# Patient Record
Sex: FEMALE | Race: ASIAN | Employment: FULL TIME | URBAN - METROPOLITAN AREA
[De-identification: names, ages, dates, MRNs, and addresses within clinical notes are randomized per-mention and may not be internally consistent; named-entity substitution may affect disease eponyms.]

---

## 2018-10-02 ENCOUNTER — RADIANT APPOINTMENT (OUTPATIENT)
Dept: GENERAL RADIOLOGY | Facility: CLINIC | Age: 38
End: 2018-10-02
Attending: FAMILY MEDICINE
Payer: COMMERCIAL

## 2018-10-02 ENCOUNTER — OFFICE VISIT (OUTPATIENT)
Dept: ORTHOPEDICS | Facility: CLINIC | Age: 38
End: 2018-10-02
Payer: COMMERCIAL

## 2018-10-02 VITALS
HEIGHT: 64 IN | BODY MASS INDEX: 21.51 KG/M2 | DIASTOLIC BLOOD PRESSURE: 67 MMHG | SYSTOLIC BLOOD PRESSURE: 114 MMHG | WEIGHT: 126 LBS

## 2018-10-02 DIAGNOSIS — M25.571 PAIN IN JOINT INVOLVING RIGHT ANKLE AND FOOT: Primary | ICD-10-CM

## 2018-10-02 DIAGNOSIS — M76.821 POSTERIOR TIBIAL TENDINITIS OF RIGHT LOWER EXTREMITY: ICD-10-CM

## 2018-10-02 DIAGNOSIS — M25.571 PAIN IN JOINT INVOLVING RIGHT ANKLE AND FOOT: ICD-10-CM

## 2018-10-02 PROCEDURE — 73610 X-RAY EXAM OF ANKLE: CPT | Mod: RT

## 2018-10-02 PROCEDURE — 99203 OFFICE O/P NEW LOW 30 MIN: CPT | Performed by: FAMILY MEDICINE

## 2018-10-02 NOTE — MR AVS SNAPSHOT
"              After Visit Summary   10/2/2018    Elvira Ding    MRN: [id number]           Patient Information     Date Of Birth          1980        Visit Information        Provider Department      10/2/2018 5:40 PM Andrea Peters MD [facility] [place]        Today's Diagnoses     Pain in joint involving right ankle and foot    -  1    Posterior tibial tendinitis of right lower extremity           Follow-ups after your visit        Who to contact     If you have questions or need follow up information about today's clinic visit or your schedule please contact [facility] [place] directly at [phone].  Normal or non-critical lab and imaging results will be communicated to you by MyChart, letter or phone within 4 business days after the clinic has received the results. If you do not hear from us within 7 days, please contact the clinic through MyChart or phone. If you have a critical or abnormal lab result, we will notify you by phone as soon as possible.  Submit refill requests through [employer] or call your pharmacy and they will forward the refill request to us. Please allow 3 business days for your refill to be completed.          Additional Information About Your Visit        MyChart Information     [employer] lets you send messages to your doctor, view your test results, renew your prescriptions, schedule appointments and more. To sign up, go to [email or web address] . Click on \"Log in\" on the left side of the screen, which will take you to the Welcome page. Then click on \"Sign up Now\" on the right side of the page.     You will be asked to enter the access code listed below, as well as some personal information. Please follow the directions to create your username and password.     Your access code is: [email or web address]  Expires: 2018  7:06 PM     Your access code will  in 90 days. If you need help or a new code, please call " "your [place] clinic or [phone].        Care EveryWhere ID     This is your Care EveryWhere ID. This could be used by other organizations to access your [place] medical records  [id number]        Your Vitals Were     Height BMI (Body Mass Index)                5' 4\" (1.626 m) 21.63 kg/m2           Blood Pressure from Last 3 Encounters:   10/02/18 114/67    Weight from Last 3 Encounters:   10/02/18 126 lb (57.2 kg)               Primary Care Provider Office Phone # Fax #    Merry Campos [phone]       [facility] [address] [facility] [address]        Equal Access to Services     [facility]: Hadii aad ku hadasho Soomaali, waaxda luqadaha, qaybta kaalmada adeegyada, waxay idiin hayaan adeeg kharajoce lamary . So [facility] [phone].    ATENCIÓN: Si habla español, tiene a ramierz disposición servicios gratuitos de asistencia lingüística. Llame al [phone].    We comply with applicable federal civil rights laws and [place] laws. We do not discriminate on the basis of race, color, national origin, age, disability, sex, sexual orientation, or gender identity.            Thank you!     Thank you for choosing [facility] [place]  for your care. Our goal is always to provide you with excellent care. Hearing back from our patients is one way we can continue to improve our services. Please take a few minutes to complete the written survey that you may receive in the mail after your visit with us. Thank you!             Your Updated Medication List - Protect others around you: Learn how to safely use, store and throw away your medicines at www.disposemymeds.org.      Notice  As of 10/2/2018  7:06 PM    You have not been prescribed any medications.      "

## 2018-10-02 NOTE — LETTER
"    10/2/2018         RE: Elvira Ding  [address]  [place] [address]        Dear Colleague,    Thank you for referring your patient, Elvira Ding, to the [facility] [place]. Please see a copy of my visit note below.    HPI     [facility]   Clinic Visit s Oct 2, 2018    PCP: System, Provider Not In      [facility] is a 38 year old female who is seen as self referral for   Chief Complaint   Patient presents with     Right Ankle - Pain       Injury: Reports insidious onset without acute precipitating event.   started running 6 months ago, no pain until recently but when increasing mileage.     Location of Pain: right ankle medial, nonradiating   Duration of Pain: 2 week(s)  Rating of Pain at worst: 5/10  Rating of Pain Currently: 4/10  Pain is better with: rest   Pain is worse with: running   Treatment so far consists of: ice and rest  Associated symptoms: swelling Mild  Recent imaging completed: No recent imaging completed.  Prior History of related problems: none    Audience, over 2 years old.   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 No pertinent family history.    Social History     Social History     Marital status:      Spouse name: N/A     Number of children: N/A     Years of education: N/A     Social History Main Topics     Smoking status: Not on file     Smokeless tobacco: Not on file     Alcohol use Not on file       Past Surgical History:   Procedure Laterality Date     NO HISTORY OF SURGERY           Review of Systems   Musculoskeletal: Positive for joint pain.   All other systems reviewed and are negative.        Physical Exam  /67  Ht 5' 4\" (1.626 m)  Wt 126 lb (57.2 kg)  BMI 21.63 kg/m2  Constitutional:well-developed, well-nourished, and in no distress.   Cardiovascular: Intact distal pulses.  "   Neurological: alert. Gait Normal:   Gait, station, stance, and balance appear normal for age  Skin: Skin is warm and dry.   Psychiatric: Mood and affect normal.   Respiratory: unlabored, speaks in full sentences  Lymph: no LAD, no lymphangitis    Right Ankle Exam   Swelling: Mild    Tenderness   The patient is experiencing tenderness in the deltoid.    Range of Motion   Dorsiflexion:     Normal  Plantar flexion: Normal  Inversion:         Normal  Eversion:         Normal    Muscle Strength   Dorsiflexion:         5/5  Plantar flexion:     5/5  Anterior tibial:       5/5  Posterior tibial:     5/5  Gastrosoleus:      5/5  Peroneal muscle: 5/5    Tests   Anterior drawer: Negative  Varus tilt: NegativeComments  Mild ankle valgus vs LEFT             X-ray images Ordered and independently reviewed by me in the office today with the patient. X-ray shows:   Recent Results (from the past 48 hour(s))   XR Ankle Right G/E 3 Views    Narrative    ANKLE THREE VIEWS RIGHT  10/2/2018 6:17 PM     HISTORY:  Pain in joint involving right ankle and foot    COMPARISON: None.    FINDINGS: There is no significant degenerative change. The ankle  mortise appears intact. There is no acute fracture or dislocation.  There are no worrisome bony lesions.      Impression    IMPRESSION: No acute osseous abnormality demonstrated.    CHERRY MELGAR MD     ASSESSMENT/PLAN    ICD-10-CM    1. Pain in joint involving right ankle and foot M25.571 XR Ankle Right G/E 3 Views   2. Posterior tibial tendinitis of right lower extremity M76.821      Mildly valgus ankle with 2-year-old shoes and increased running mileage triggering posterior tibial tendinopathy.  Urged getting quality running shoes from specialty running store and consider super feet arch supports as well.  Physical therapy discussed, deferred for now.  Recheck 1 month if symptoms are not resolving.    Again, thank you for allowing me to participate in the care of your patient.         Sincerely,        Andrea Peters MD

## 2018-10-02 NOTE — PROGRESS NOTES
"[facility]   Clinic Visit s Oct 2, 2018    PCP: System, Provider Not In      Elvira is a 38 year old female who is seen as self referral for   Chief Complaint   Patient presents with     Right Ankle - Pain       Injury: Reports insidious onset without acute precipitating event.   started running 6 months ago, no pain until recently but when increasing mileage.     Location of Pain: right ankle medial, nonradiating   Duration of Pain: 2 week(s)  Rating of Pain at worst: 5/10  Rating of Pain Currently: 4/10  Pain is better with: rest   Pain is worse with: running   Treatment so far consists of: ice and rest  Associated symptoms: swelling Mild  Recent imaging completed: No recent imaging completed.  Prior History of related problems: none    [employer] shoes, over 2 years old.   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 No pertinent family history.    Social History     Social History     Marital status:      Spouse name: N/A     Number of children: N/A     Years of education: N/A     Social History Main Topics     Smoking status: Not on file     Smokeless tobacco: Not on file     Alcohol use Not on file       Past Surgical History:   Procedure Laterality Date     NO HISTORY OF SURGERY           Review of Systems   Musculoskeletal: Positive for joint pain.   All other systems reviewed and are negative.        Physical Exam  /67  Ht 5' 4\" (1.626 m)  Wt 126 lb (57.2 kg)  BMI 21.63 kg/m2  Constitutional:well-developed, well-nourished, and in no distress.   Cardiovascular: Intact distal pulses.    Neurological: alert. Gait Normal:   Gait, station, stance, and balance appear normal for age  Skin: Skin is warm and dry.   Psychiatric: Mood and affect normal.   Respiratory: unlabored, speaks in full sentences  Lymph: no LAD, no lymphangitis    Right Ankle Exam   Swelling: " Mild    Tenderness   The patient is experiencing tenderness in the deltoid.    Range of Motion   Dorsiflexion:     Normal  Plantar flexion: Normal  Inversion:         Normal  Eversion:         Normal    Muscle Strength   Dorsiflexion:         5/5  Plantar flexion:     5/5  Anterior tibial:       5/5  Posterior tibial:     5/5  Gastrosoleus:      5/5  Peroneal muscle: 5/5    Tests   Anterior drawer: Negative  Varus tilt: NegativeComments  Mild ankle valgus vs LEFT             X-ray images Ordered and independently reviewed by me in the office today with the patient. X-ray shows:   Recent Results (from the past 48 hour(s))   XR Ankle Right G/E 3 Views    Narrative    ANKLE THREE VIEWS RIGHT  10/2/2018 6:17 PM     HISTORY:  Pain in joint involving right ankle and foot    COMPARISON: None.    FINDINGS: There is no significant degenerative change. The ankle  mortise appears intact. There is no acute fracture or dislocation.  There are no worrisome bony lesions.      Impression    IMPRESSION: No acute osseous abnormality demonstrated.    CHERRY MELGAR MD     ASSESSMENT/PLAN    ICD-10-CM    1. Pain in joint involving right ankle and foot M25.571 XR Ankle Right G/E 3 Views   2. Posterior tibial tendinitis of right lower extremity M76.821      Mildly valgus ankle with 2-year-old shoes and increased running mileage triggering posterior tibial tendinopathy.  Urged getting quality running shoes from specialty running [employer] and consider super feet arch supports as well.  Physical therapy discussed, deferred for now.  Recheck 1 month if symptoms are not resolving.

## 2021-12-31 ENCOUNTER — OFFICE VISIT (OUTPATIENT)
Dept: FAMILY MEDICINE | Facility: CLINIC | Age: 41
End: 2021-12-31
Payer: COMMERCIAL

## 2021-12-31 VITALS
DIASTOLIC BLOOD PRESSURE: 76 MMHG | OXYGEN SATURATION: 100 % | TEMPERATURE: 98.2 F | SYSTOLIC BLOOD PRESSURE: 114 MMHG | BODY MASS INDEX: 24.68 KG/M2 | HEART RATE: 79 BPM | WEIGHT: 143.8 LBS

## 2021-12-31 DIAGNOSIS — U07.1 INFECTION DUE TO 2019 NOVEL CORONAVIRUS: Primary | ICD-10-CM

## 2021-12-31 DIAGNOSIS — H92.01 DISCOMFORT OF RIGHT EAR: ICD-10-CM

## 2021-12-31 PROBLEM — E55.9 VITAMIN D DEFICIENCY: Status: ACTIVE | Noted: 2018-09-10

## 2021-12-31 PROCEDURE — 99203 OFFICE O/P NEW LOW 30 MIN: CPT | Performed by: FAMILY MEDICINE

## 2021-12-31 NOTE — PROGRESS NOTES
Subjective   Elvira is a 41 year old who presents for the following health issues:    HPI     Ear pain   Hurts more when coughs, 2-3 days, more right sided than left, but left somewhat also. Runny nose.  Positive at home covid test.  Has had a cold for about 8 days. Previously had body aches.  Headache, sore throat. Dayquil/nyquil for 3-4 days.  Has taken tylenol/ibuprofen at times as well.      Review of Systems   Constitutional, HEENT, cardiovascular, pulmonary, GI, , musculoskeletal, neuro, skin, endocrine and psych systems are negative, except as otherwise noted.      Objective    /76 (BP Location: Left arm, Patient Position: Sitting, Cuff Size: Adult Regular)   Pulse 79   Temp 98.2  F (36.8  C) (Tympanic)   Wt 65.2 kg (143 lb 12.8 oz)   SpO2 100%   BMI 24.68 kg/m    Body mass index is 24.68 kg/m .  Physical Exam   GENERAL: healthy, alert and no distress  HENT: ear canals and TM's normal, nose and mouth without ulcers or lesions  NECK: no adenopathy, no asymmetry, masses, or scars and thyroid normal to palpation  RESP: lungs clear to auscultation - no rales, rhonchi or wheezes  CV: regular rate and rhythm, normal S1 S2, no S3 or S4, no murmur, click or rub, no peripheral edema and peripheral pulses strong  ABDOMEN: soft, nontender, no hepatosplenomegaly, no masses and bowel sounds normal  MS: no gross musculoskeletal defects noted, no edema    A/P:  (U07.1) Infection due to 2019 novel coronavirus  (primary encounter diagnosis)  Comment:   Plan: lung exam normal. O2 sats 100%. Patient non-toxic appearing. Discussed s/s when to f/u in clinic or ER for evaluation. May take ibuprofen and/acetminophen for headaches or pain.    (H92.01) Discomfort of right ear  Comment:   Plan: no signs of infection. Likely eustachian dysfunction discussed and should resolve with time. If not, patient can RTC for recheck.    Kolton Adams MD

## 2021-12-31 NOTE — PATIENT INSTRUCTIONS
At [facility], we strive to deliver an exceptional experience to you, every time we see you. If you receive a survey, please complete it as we do value your feedback.  If you have MyChart, you can expect to receive results automatically within 24 hours of their completion.  Your provider will send a note interpreting your results as well.   If you do not have MyChart, you should receive your results in about a week by mail.    Your care team:                            Family Medicine Internal Medicine   MD Srinivasa Harden MD Shantel Branch-Fleming, MD Srinivasa Vaka, MD Katya Belousova, PAKETAN [place], APRN CNP    Kolton Adams, MD Pediatrics   Blanco Ramos, PAKETAN Rai, CNP MD Laura Loomis APRN CNP   MD Cammie Puente MD Deborah Mielke, MD Danielle Moses, APRN [facility]      Clinic hours: Monday - Thursday 7 am-6 pm; Fridays 7 am-5 pm.   Urgent care: Monday - Friday 10 am- 8 pm; Saturday and Sunday 9 am-5 pm.    Clinic: [phone]       [facility]: Monday - Thursday 8 am - 7 pm; Friday 8 am - 6 pm  [facility]: [phone]     Use www.[email or web address].org for 24/7 diagnosis and treatment of dozens of conditions.